# Patient Record
Sex: MALE | Race: WHITE | HISPANIC OR LATINO | URBAN - METROPOLITAN AREA
[De-identification: names, ages, dates, MRNs, and addresses within clinical notes are randomized per-mention and may not be internally consistent; named-entity substitution may affect disease eponyms.]

---

## 2018-05-16 ENCOUNTER — OPTICAL OFFICE (OUTPATIENT)
Dept: URBAN - METROPOLITAN AREA CLINIC 146 | Facility: CLINIC | Age: 53
Setting detail: OPHTHALMOLOGY
End: 2018-05-16
Payer: COMMERCIAL

## 2018-05-16 ENCOUNTER — RX ONLY (RX ONLY)
Age: 53
End: 2018-05-16

## 2018-05-16 ENCOUNTER — DOCTOR'S OFFICE (OUTPATIENT)
Dept: URBAN - METROPOLITAN AREA CLINIC 137 | Facility: CLINIC | Age: 53
Setting detail: OPHTHALMOLOGY
End: 2018-05-16
Payer: COMMERCIAL

## 2018-05-16 DIAGNOSIS — H52.03: ICD-10-CM

## 2018-05-16 DIAGNOSIS — H52.4: ICD-10-CM

## 2018-05-16 PROCEDURE — V2103 SPHEROCYLINDR 4.00D/12-2.00D: HCPCS | Performed by: OPHTHALMOLOGY

## 2018-05-16 PROCEDURE — V2020 VISION SVCS FRAMES PURCHASES: HCPCS | Performed by: OPHTHALMOLOGY

## 2018-05-16 PROCEDURE — V2100 LENS SPHER SINGLE PLANO 4.00: HCPCS | Performed by: OPHTHALMOLOGY

## 2018-05-16 PROCEDURE — 92004 COMPRE OPH EXAM NEW PT 1/>: CPT | Performed by: OPHTHALMOLOGY

## 2018-05-16 ASSESSMENT — REFRACTION_AUTOREFRACTION
OD_CYLINDER: +0.25
OD_SPHERE: +0.50
OS_SPHERE: +0.75
OS_CYLINDER: SPH
OD_AXIS: 74

## 2018-05-16 ASSESSMENT — REFRACTION_MANIFEST
OS_VA1: 20/
OD_VA3: 20/
OD_VA1: 20/
OD_VA3: 20/
OS_VA2: 20/
OD_VA2: 20/
OS_VA3: 20/
OS_VA1: 20/
OU_VA: 20/
OS_VA3: 20/
OD_VA2: 20/
OS_VA2: 20/
OD_VA1: 20/
OU_VA: 20/

## 2018-05-16 ASSESSMENT — REFRACTION_OUTSIDERX
OS_CYLINDER: SPH
OD_VA1: 20/20
OD_VA3: 20/
OD_AXIS: 75
OD_ADD: +1.75
OS_ADD: +1.75
OS_VA2: 20/20(J1+)
OS_SPHERE: +0.50
OU_VA: 20/20
OD_CYLINDER: +0.50
OS_VA3: 20/
OS_VA1: 20/20
OD_VA2: 20/20(J1+)
OD_SPHERE: +0.50

## 2018-05-16 ASSESSMENT — REFRACTION_CURRENTRX
OS_OVR_VA: 20/
OD_OVR_VA: 20/
OS_OVR_VA: 20/
OS_OVR_VA: 20/

## 2018-05-16 ASSESSMENT — SPHEQUIV_DERIVED: OD_SPHEQUIV: 0.625

## 2018-05-16 ASSESSMENT — VISUAL ACUITY
OD_BCVA: 20/25-1
OS_BCVA: 20/40

## 2018-05-16 ASSESSMENT — CONFRONTATIONAL VISUAL FIELD TEST (CVF)
OD_FINDINGS: FULL
OS_FINDINGS: FULL

## 2018-06-05 ENCOUNTER — DOCTOR'S OFFICE (OUTPATIENT)
Dept: URBAN - METROPOLITAN AREA CLINIC 136 | Facility: CLINIC | Age: 53
Setting detail: OPHTHALMOLOGY
End: 2018-06-05
Payer: COMMERCIAL

## 2018-06-05 DIAGNOSIS — H40.023: ICD-10-CM

## 2018-06-05 DIAGNOSIS — H25.13: ICD-10-CM

## 2018-06-05 PROCEDURE — 92133 CPTRZD OPH DX IMG PST SGM ON: CPT | Performed by: OPHTHALMOLOGY

## 2018-06-05 PROCEDURE — 92014 COMPRE OPH EXAM EST PT 1/>: CPT | Performed by: OPHTHALMOLOGY

## 2018-06-05 PROCEDURE — 76514 ECHO EXAM OF EYE THICKNESS: CPT | Performed by: OPHTHALMOLOGY

## 2018-06-05 ASSESSMENT — REFRACTION_MANIFEST
OD_VA1: 20/
OD_VA1: 20/
OS_VA3: 20/
OD_VA2: 20/
OS_VA2: 20/
OS_VA3: 20/
OU_VA: 20/
OD_VA2: 20/
OD_VA3: 20/
OU_VA: 20/
OS_VA1: 20/
OS_VA2: 20/
OS_VA1: 20/
OD_VA3: 20/

## 2018-06-05 ASSESSMENT — REFRACTION_OUTSIDERX
OS_VA3: 20/
OS_VA1: 20/20
OS_CYLINDER: SPH
OD_SPHERE: +0.50
OS_VA2: 20/20(J1+)
OD_VA3: 20/
OU_VA: 20/20
OD_ADD: +1.75
OD_CYLINDER: +0.50
OS_ADD: +1.75
OD_VA1: 20/20
OD_AXIS: 75
OD_VA2: 20/20(J1+)
OS_SPHERE: +0.50

## 2018-06-05 ASSESSMENT — SPHEQUIV_DERIVED
OS_SPHEQUIV: 0.5
OD_SPHEQUIV: 0.25

## 2018-06-05 ASSESSMENT — REFRACTION_CURRENTRX
OS_OVR_VA: 20/
OS_OVR_VA: 20/
OD_OVR_VA: 20/
OS_OVR_VA: 20/
OD_OVR_VA: 20/
OD_OVR_VA: 20/

## 2018-06-05 ASSESSMENT — REFRACTION_AUTOREFRACTION
OD_CYLINDER: -0.50
OS_SPHERE: +0.75
OS_AXIS: 073
OD_AXIS: 176
OD_SPHERE: +0.50
OS_CYLINDER: -0.50

## 2018-06-05 ASSESSMENT — SUPERFICIAL PUNCTATE KERATITIS (SPK)
OS_SPK: T
OD_SPK: T

## 2018-06-05 ASSESSMENT — VISUAL ACUITY
OS_BCVA: 20/30-1
OD_BCVA: 20/25-1

## 2018-06-05 ASSESSMENT — CONFRONTATIONAL VISUAL FIELD TEST (CVF)
OD_FINDINGS: FULL
OS_FINDINGS: FULL

## 2019-02-19 ENCOUNTER — DOCTOR'S OFFICE (OUTPATIENT)
Dept: URBAN - METROPOLITAN AREA CLINIC 136 | Facility: CLINIC | Age: 54
Setting detail: OPHTHALMOLOGY
End: 2019-02-19
Payer: COMMERCIAL

## 2019-02-19 DIAGNOSIS — H40.033: ICD-10-CM

## 2019-02-19 DIAGNOSIS — H40.023: ICD-10-CM

## 2019-02-19 PROCEDURE — 92083 EXTENDED VISUAL FIELD XM: CPT | Performed by: OPHTHALMOLOGY

## 2019-02-19 PROCEDURE — 92012 INTRM OPH EXAM EST PATIENT: CPT | Performed by: OPHTHALMOLOGY

## 2019-02-19 ASSESSMENT — REFRACTION_MANIFEST
OS_CYLINDER: SPH
OS_SPHERE: +0.50
OS_VA3: 20/
OD_ADD: +1.75
OS_VA3: 20/
OS_VA2: 20/20(J1+)
OU_VA: 20/20
OS_VA1: 20/
OD_VA2: 20/
OD_VA2: 20/20(J1+)
OD_SPHERE: +0.50
OD_CYLINDER: +0.50
OD_AXIS: 75
OS_VA2: 20/
OD_VA3: 20/
OS_ADD: +1.75
OU_VA: 20/
OD_VA1: 20/
OD_VA3: 20/
OS_VA1: 20/20
OD_VA1: 20/20

## 2019-02-19 ASSESSMENT — VISUAL ACUITY
OS_BCVA: 20/30-1
OD_BCVA: 20/25-1

## 2019-02-19 ASSESSMENT — SUPERFICIAL PUNCTATE KERATITIS (SPK)
OS_SPK: T
OD_SPK: T

## 2019-02-19 ASSESSMENT — REFRACTION_CURRENTRX
OS_OVR_VA: 20/
OD_OVR_VA: 20/
OS_OVR_VA: 20/
OS_OVR_VA: 20/
OD_OVR_VA: 20/
OD_OVR_VA: 20/

## 2019-02-19 ASSESSMENT — REFRACTION_AUTOREFRACTION
OD_SPHERE: +0.50
OS_CYLINDER: -0.50
OD_AXIS: 176
OD_CYLINDER: -0.50
OS_AXIS: 073
OS_SPHERE: +0.75

## 2019-02-19 ASSESSMENT — SPHEQUIV_DERIVED
OS_SPHEQUIV: 0.5
OD_SPHEQUIV: 0.25
OD_SPHEQUIV: 0.75

## 2019-02-19 ASSESSMENT — CONFRONTATIONAL VISUAL FIELD TEST (CVF)
OD_FINDINGS: FULL
OS_FINDINGS: FULL

## 2019-05-21 ENCOUNTER — DOCTOR'S OFFICE (OUTPATIENT)
Dept: URBAN - METROPOLITAN AREA CLINIC 136 | Facility: CLINIC | Age: 54
Setting detail: OPHTHALMOLOGY
End: 2019-05-21
Payer: COMMERCIAL

## 2019-05-21 DIAGNOSIS — H40.023: ICD-10-CM

## 2019-05-21 DIAGNOSIS — H40.033: ICD-10-CM

## 2019-05-21 PROCEDURE — 92132 CPTRZD OPH DX IMG ANT SGM: CPT | Performed by: OPHTHALMOLOGY

## 2019-05-21 PROCEDURE — 92012 INTRM OPH EXAM EST PATIENT: CPT | Performed by: OPHTHALMOLOGY

## 2019-05-21 ASSESSMENT — REFRACTION_MANIFEST
OS_CYLINDER: SPH
OS_ADD: +1.75
OS_VA2: 20/20(J1+)
OD_CYLINDER: +0.50
OU_VA: 20/20
OD_VA1: 20/20
OS_VA1: 20/
OS_VA3: 20/
OS_VA1: 20/20
OD_VA2: 20/
OD_VA3: 20/
OD_AXIS: 75
OD_ADD: +1.75
OS_VA3: 20/
OU_VA: 20/
OS_SPHERE: +0.50
OD_SPHERE: +0.50
OD_VA1: 20/
OD_VA3: 20/
OS_VA2: 20/
OD_VA2: 20/20(J1+)

## 2019-05-21 ASSESSMENT — REFRACTION_AUTOREFRACTION
OS_CYLINDER: -0.50
OD_SPHERE: +0.50
OD_AXIS: 176
OD_CYLINDER: -0.50
OS_AXIS: 073
OS_SPHERE: +0.75

## 2019-05-21 ASSESSMENT — REFRACTION_CURRENTRX
OD_OVR_VA: 20/
OD_OVR_VA: 20/
OS_OVR_VA: 20/
OD_OVR_VA: 20/

## 2019-05-21 ASSESSMENT — SPHEQUIV_DERIVED
OD_SPHEQUIV: 0.25
OS_SPHEQUIV: 0.5
OD_SPHEQUIV: 0.75

## 2019-05-21 ASSESSMENT — CONFRONTATIONAL VISUAL FIELD TEST (CVF)
OD_FINDINGS: FULL
OS_FINDINGS: FULL

## 2019-05-21 ASSESSMENT — VISUAL ACUITY
OS_BCVA: 20/25-2
OD_BCVA: 20/25-1

## 2019-05-21 ASSESSMENT — SUPERFICIAL PUNCTATE KERATITIS (SPK)
OD_SPK: T
OS_SPK: T

## 2019-06-26 ENCOUNTER — AMBUL SURGICAL CARE (OUTPATIENT)
Dept: URBAN - METROPOLITAN AREA SURGERY 32 | Facility: SURGERY | Age: 54
Setting detail: OPHTHALMOLOGY
End: 2019-06-26
Payer: COMMERCIAL

## 2019-06-26 DIAGNOSIS — H40.032: ICD-10-CM

## 2019-06-26 PROCEDURE — 66761 REVISION OF IRIS: CPT | Performed by: OPHTHALMOLOGY

## 2019-06-26 PROCEDURE — G8918 PT W/O PREOP ORDER IV AB PRO: HCPCS | Performed by: OPHTHALMOLOGY

## 2019-06-26 PROCEDURE — G8907 PT DOC NO EVENTS ON DISCHARG: HCPCS | Performed by: OPHTHALMOLOGY

## 2019-10-02 ENCOUNTER — AMBUL SURGICAL CARE (OUTPATIENT)
Dept: URBAN - METROPOLITAN AREA SURGERY 32 | Facility: SURGERY | Age: 54
Setting detail: OPHTHALMOLOGY
End: 2019-10-02
Payer: COMMERCIAL

## 2019-10-02 DIAGNOSIS — H40.031: ICD-10-CM

## 2019-10-02 PROCEDURE — 66761 REVISION OF IRIS: CPT | Performed by: OPHTHALMOLOGY

## 2019-10-02 PROCEDURE — G8907 PT DOC NO EVENTS ON DISCHARG: HCPCS | Performed by: OPHTHALMOLOGY

## 2019-10-02 PROCEDURE — G8918 PT W/O PREOP ORDER IV AB PRO: HCPCS | Performed by: OPHTHALMOLOGY

## 2019-11-05 ENCOUNTER — DOCTOR'S OFFICE (OUTPATIENT)
Dept: URBAN - METROPOLITAN AREA CLINIC 136 | Facility: CLINIC | Age: 54
Setting detail: OPHTHALMOLOGY
End: 2019-11-05
Payer: COMMERCIAL

## 2019-11-05 DIAGNOSIS — H40.033: ICD-10-CM

## 2019-11-05 PROBLEM — H25.13 NUCLEAR SCLEROSIS; BOTH EYES: Status: ACTIVE | Noted: 2018-06-05

## 2019-11-05 PROBLEM — H40.023: Status: ACTIVE | Noted: 2018-06-05

## 2019-11-05 PROBLEM — H52.4 PRESBYOPIA ; BOTH EYES: Status: ACTIVE | Noted: 2018-05-16

## 2019-11-05 PROCEDURE — 92012 INTRM OPH EXAM EST PATIENT: CPT | Performed by: OPHTHALMOLOGY

## 2019-11-05 ASSESSMENT — REFRACTION_MANIFEST
OD_VA1: 20/20
OD_VA2: 20/20(J1+)
OD_SPHERE: +0.50
OD_AXIS: 75
OS_VA2: 20/20(J1+)
OS_VA1: 20/20
OD_ADD: +1.75
OS_SPHERE: +0.50
OD_VA3: 20/
OS_VA3: 20/
OS_ADD: +1.75
OS_VA3: 20/
OU_VA: 20/20
OS_CYLINDER: SPH
OD_CYLINDER: +0.50
OS_VA1: 20/
OD_VA3: 20/
OD_VA2: 20/
OS_VA2: 20/
OD_VA1: 20/
OU_VA: 20/

## 2019-11-05 ASSESSMENT — AXIALLENGTH_DERIVED
OD_AL: 23.214
OD_AL: 22.9806
OS_AL: 23.2978

## 2019-11-05 ASSESSMENT — REFRACTION_CURRENTRX
OS_OVR_VA: 20/
OD_OVR_VA: 20/
OD_OVR_VA: 20/
OS_OVR_VA: 20/
OS_OVR_VA: 20/
OD_OVR_VA: 20/

## 2019-11-05 ASSESSMENT — REFRACTION_AUTOREFRACTION
OS_SPHERE: +1.00
OS_CYLINDER: 0.00
OS_AXIS: 000
OD_SPHERE: +1.50
OD_CYLINDER: -0.25
OD_AXIS: 019

## 2019-11-05 ASSESSMENT — SPHEQUIV_DERIVED
OD_SPHEQUIV: 1.375
OD_SPHEQUIV: 0.75
OS_SPHEQUIV: 1

## 2019-11-05 ASSESSMENT — KERATOMETRY
OD_K1POWER_DIOPTERS: 43.25
OS_K2POWER_DIOPTERS: 43.50
OS_AXISANGLE_DEGREES: 090
OD_AXISANGLE_DEGREES: 091
OD_K2POWER_DIOPTERS: 44.25
OS_K1POWER_DIOPTERS: 43.00

## 2019-11-05 ASSESSMENT — VISUAL ACUITY
OD_BCVA: 20/25+3
OS_BCVA: 20/20

## 2019-11-05 ASSESSMENT — CONFRONTATIONAL VISUAL FIELD TEST (CVF)
OD_FINDINGS: FULL
OS_FINDINGS: FULL

## 2019-11-05 ASSESSMENT — SUPERFICIAL PUNCTATE KERATITIS (SPK)
OS_SPK: T
OD_SPK: T

## 2024-06-25 ENCOUNTER — OFFICE VISIT (OUTPATIENT)
Dept: DENTISTRY | Facility: CLINIC | Age: 59
End: 2024-06-25

## 2024-06-25 VITALS — DIASTOLIC BLOOD PRESSURE: 99 MMHG | HEART RATE: 70 BPM | SYSTOLIC BLOOD PRESSURE: 156 MMHG

## 2024-06-25 DIAGNOSIS — K08.89 NON-RESTORABLE TOOTH: Primary | ICD-10-CM

## 2024-06-25 PROCEDURE — D0220 INTRAORAL - PERIAPICAL FIRST RADIOGRAPHIC IMAGE: HCPCS

## 2024-06-25 PROCEDURE — D7140 EXTRACTION, ERUPTED TOOTH OR EXPOSED ROOT (ELEVATION AND/OR FORCEPS REMOVAL): HCPCS

## 2024-06-25 RX ORDER — AMOXICILLIN 500 MG/1
500 CAPSULE ORAL EVERY 8 HOURS SCHEDULED
Qty: 21 CAPSULE | Refills: 0 | Status: CANCELLED | OUTPATIENT
Start: 2024-06-25 | End: 2024-07-02

## 2024-06-25 NOTE — DENTAL PROCEDURE DETAILS
"Limited Exam / Extraction #28    Heraclio Wood 58 y.o. male presents to [facility] for Limited exam. Patient was accompanied with wife who remained in waiting room during treatment.     PMH reviewed, no changes, ASA II.   Treatment consents signed: Yes   Pain scale: 10  Perio: Generalized periodontitis   Caries risk assessment: High  Radiographs: PA of #28 taken today   Oral hygiene instructions reviewed and given.   Hygiene recall visits recommended to patient.     Chief complaint: \"My lower right tooth is hurting a lot. It started 2 days ago. I took Naproxen but it did not help at all.\"     Consent:  Discussed that limited exam focuses on problem area, and same day tx is not guaranteed.  Patient explained to if they wish to have anything else evaluated, they need to return to the practice at which they are a patient of record or receive a comprehensive exam.  Patient understands and consents.    Subjective history:    Onset: 2 days ago   Provocation: Biting and Chewing   Quality: Dull   Region: Patient points to tooth #28   Severity: 10/10   Timing: constant    Objective clinical findings:   Oral cancer screening: No abnormalities detected   Extraoral exam: WNL  Intraoral exam: No fistula or abscess noted, gingival inflammation noted around #28 and other sites.   - Other relevant findings: patient has generalized periodontitis and fractured/ non-restorable teeth in other areas of mouth.     Radiographs: [facility] PA #28    Assessment:  #28 Pulp Necrosis ; Symptomatic apical periodontitis ; significant amount of bone loss, grade mobility of 2 ; #28 is non-restorable   -Explained findings to patient and proposed most optimal treatment is extraction. Patient understood.     Plan:   Offered extraction today and patient agreed.   Diagnosis:  Teeth #28 indicated for extraction due to Periodontally hopeless prognosis and pulp necrosis.     Prognosis:  good    Consent:  Diagnosis and tx plan reviewed with patient.  Risks of " specific procedure: pain, bleeding, swelling, infection, tooth fracturing to point of requiring surgical removal, damage to adjacent teeth and/or restorations on them.  Risks of any dental procedure: post procedural pain or sensitivity, local anesthetic side effects, allergic reaction to dental materials and medications, breakage of local anesthetic needle, aspiration of small dental tools, injury to nearby hard and soft tissues and anatomical structures.  Benefits: relieve pain or underlying infection, prevent future or further progression of infection.  Alternatives: no tx.  Oral surgery consent form reviewed and signed.  Patient understands and consents.    [place] Protocol  Other Assisting Provider: Yes, Samantha Desir (assistant)  Verbal consent obtained? YES  Written consent obtained?  YES  Risks, benefits and alternatives discussed?: YES  Consent given by: the patient  Time Out  Immediately prior to the procedure a time out was called: YES  Time Out:   A time out verifies correct patient, procedure, equipment, support staff and site/side marked as required.  Patient states understanding of procedure being performed: YES  Patient's understanding of procedure matches consent: YES  Procedure consent matches procedure scheduled: YES  Test results available and properly labeled: N/A  Site  Verified with the patient  YES  Radiology Images displayed and confirmed: YES  Required items - Required blood products, implants, devices and special equipment available: YES  Patient identity confirmed:  YES    Anesthesia:  Topical 20% benzocaine.  1 carps 2% Lidocaine 1:100k epi via mental nerve block.  1 carps 4% Septocaine 1:100k epi via buccal and palatal/lingual infiltration.    Procedure details:  Reflected gingiva with periosteal elevator  Elevated, and extracted #28 with forceps  Socket curetted and irrigated with sterile saline  Manual alveolar compression with gauze 30 seconds. Hemostasis achieved  Post op x-ray shows  remaining bone spicule. Curetted socket thoroughly and irrigated with sterile saline.     Post-op instructions given verbally and on paper.  Patient given ice and gauze    Comprehensive care disposition:  Patient expressed interest in becoming a patient of record with one of the  dental clinics.    Patient dismissed ambulatory and alert.  NV: comprehensive exam   Attending: Dr. William Rockwell

## 2024-08-12 ENCOUNTER — OFFICE VISIT (OUTPATIENT)
Dept: DENTISTRY | Facility: CLINIC | Age: 59
End: 2024-08-12

## 2024-08-12 VITALS — SYSTOLIC BLOOD PRESSURE: 119 MMHG | DIASTOLIC BLOOD PRESSURE: 79 MMHG

## 2024-08-12 DIAGNOSIS — K02.9 DENTAL CARIES: ICD-10-CM

## 2024-08-12 DIAGNOSIS — Z01.20 ENCOUNTER FOR DENTAL EXAMINATION: Primary | ICD-10-CM

## 2024-08-12 PROBLEM — Z72.0 TOBACCO USE: Status: ACTIVE | Noted: 2024-08-12

## 2024-08-12 PROCEDURE — D0150 COMPREHENSIVE ORAL EVALUATION - NEW OR ESTABLISHED PATIENT: HCPCS

## 2024-08-12 PROCEDURE — D0210 INTRAORAL - COMPLETE SERIES OF RADIOGRAPHIC IMAGES: HCPCS

## 2024-08-12 NOTE — DENTAL PROCEDURE DETAILS
COMP EXAM, FMX, PROBE EXAM   REVIEWED MED HX: meds, allergies, health changes reviewed in EPIC  Patient is a tobacco user, he could not remember any of his medications so I told him to bring in a list at his next visit  CHIEF CONCERN:     -Last dental visit- patient cannot remember  PAIN SCALE:  0  ASA CLASS:  ASA 2 - Patient with mild systemic disease with no functional limitations  PLAQUE:  heavy  CALCULUS: Generalized  BLEEDING:  heavy  STAIN :  Generalized  PERIO: Generalized Chronic Periodontitis Stage 4 Grade B/C    Visual and Tactile Intraoral/ Extraoral evaluation: Oral and Oropharyngeal cancer evaluation. No findings     Dr. Arnold -  Reviewed with patient clinical and radiographic findings and patient verbalized understanding. All questions and concerns addressed.     REFERRALS: None    CARIES FINDINGS: #7 F, #8 F, #9 DF, #10: clinically re-assess after scaling on the mesial- suspicious of mesial root caries, #11F    Non-restorable: #2, 4, 5, 12, 14, 15, 21, 31, 32    Grade 2 mobility 22-27    Discussed with patient that he has periodontal disease and needs scaling and root planing to clean out deep pockets.  Explained that all of his posterior teeth are non-restorable and need to be extracted. Discussed that the best plan of action should be to clean and extract hopeless teeth and re-assess the disease after scaling. After that I told him it would be best to discuss the prognosis of his remaining teeth and review replacing missing teeth.    Patient understood and was on board.    Prescribed Prevident toothpaste due to generalized recession and root caries       NEXT VISIT:   1)EXT 2, 4, 5  2) EXT 12, 14, 15  3) SRPs UR LR  4) SRPs UL, LL  5) EXT 21, 31, 32  6) Caries control  7) Periodontal maintenance    Last FMX : 8/12/24

## 2024-11-21 ENCOUNTER — OFFICE VISIT (OUTPATIENT)
Dept: DENTISTRY | Facility: CLINIC | Age: 59
End: 2024-11-21

## 2024-11-21 VITALS — HEART RATE: 84 BPM | DIASTOLIC BLOOD PRESSURE: 85 MMHG | SYSTOLIC BLOOD PRESSURE: 125 MMHG

## 2024-11-21 DIAGNOSIS — K05.6 PERIODONTAL DISEASE: Primary | ICD-10-CM

## 2024-11-21 PROCEDURE — D7140 EXTRACTION, ERUPTED TOOTH OR EXPOSED ROOT (ELEVATION AND/OR FORCEPS REMOVAL): HCPCS

## 2024-11-21 NOTE — PROGRESS NOTES
Procedure Details  2  - EXTRACTION, ERUPTED TOOTH OR EXPOSED ROOT (ELEVATION AND/OR FORCEPS REMOVAL)  4  - EXTRACTION, ERUPTED TOOTH OR EXPOSED ROOT (ELEVATION AND/OR FORCEPS REMOVAL)  5  - EXTRACTION, ERUPTED TOOTH OR EXPOSED ROOT (ELEVATION AND/OR FORCEPS REMOVAL)    Extraction #s 2,4, and 5    Heraclio Wood 58 y.o. male presents with self to [facility] for extraction #2,4, and 5  PMH reviewed, no changes, ASA II. Significant medical history: reviewed (downloads folder). Significant allergies: NKA. Significant medications: none  Pain level 0/10    Diagnosis:  Teeth #2,4, and 5 indicated for extraction due to Periodontally hopeless prognosis  and Retained root tip.    Consent:  Risks of specific procedure: pain, bleeding, swelling, infection, tooth fracturing to point of requiring surgical removal, damage to adjacent teeth and/or restorations on them, etc.  Risks of any dental procedure: post procedural pain or sensitivity, local anesthetic side effects, allergic reaction to dental materials and medications, breakage of local anesthetic needle, aspiration of small dental tools, injury to nearby hard and soft tissues and anatomical structures.  Benefits: relieve pain or underlying infection, prevent future or further progression of infection, etc.  Alternatives: no tx.  Tx plan for extraction #s 2,4, and 5 reviewed, pt given opportunity to ask questions, all questions answered to degree of medical and dental certainty.  Patient understands and consent given by self via signed oral surgery informed consent.    [place] Protocol  Other Assisting Provider: Yes, Samantha (assistant)  Verbal consent obtained? YES  Written consent obtained?  YES  Risks, benefits and alternatives discussed?: YES  Consent given by: self  Time Out  Immediately prior to the procedure a time out was called: YES  Time Out:  Time Out performed at:  10:30 AM  A time out verifies correct patient, procedure, equipment, support staff and  site/side marked as required.  Patient states understanding of procedure being performed: YES  Patient's understanding of procedure matches consent: YES  Procedure consent matches procedure scheduled: YES  Test results available and properly labeled: N/A  Site  Verified with the patient  YES  Radiology Images displayed and confirmed.  If images not available, report reviewed:  YES  Required items - Required blood products, implants, devices and special equipment available: YES  Patient identity confirmed:  YES    Anesthesia:  Topical 20% benzocaine.  2.5 carps lidocaine w 1:100,000 epi administered via buccal infiltration and greater palatine block    Procedure details:  Reflected gingiva with periosteal elevator.  Elevated, and extracted #2,4, and 5 with straight elevator(s) and/or forceps.  Socket curetted and irrigated with sterile saline.  Manual alveolar compression with gauze 30 seconds. Hemostasis achieved.  3-0 chromic gut sutures placed to close papilla    Post-op instructions given verbally and on paper.  Patient given ice and gauze.    Rx: None.    Patient dismissed ambulatory and alert.    NV: continue ext planned for UL quad    Attending: Dr. Thomas was present in clinic.